# Patient Record
(demographics unavailable — no encounter records)

---

## 2025-02-28 NOTE — ASSESSMENT
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   She C/O LLQ pain ever since colon perforation in 2007 that is intermittent and sometimes goes away after having a BM. She denied constipation, diarrhea, or blood in the stool. She sometimes gets postprandial epigastric pain (q 2 months or less) that gets better with drinking water. No vomiting, dysphagia, melena, or weight loss.   #H/O LLQ pain, intermittent ever since 2007 after colon perforation #Diverticulosis #CRC screening - All prior CT scans, US, MRI, labs reviewed - Cologuard test done recently; will obtain a copy of the results when it is available - Pt is very reluctant to have a colonoscopy at this time due to concern for increased irritation to her rectum from the prep - Will get an MRI of Abdomen and Pelvis with IV Contrast since she cannot have contrast with CT due to  allergy - Will also get a copy of her most recent labs done at [facility]; pt refused repeat labs at this time - Pt educated about diverticulosis and the need to follow a high fiber diet with increased water intake to ensure regular BMs q day without any straining to prevent diverticulitis  - Will consider a CT virtual colonoscoy depending on the results of the cologuard and MRI  Pt and her  verbalized understanding and agreed with the plan. F/U after MRI is done

## 2025-02-28 NOTE — PHYSICAL EXAM
[Alert] : alert [Normal Voice/Communication] : normal voice/communication [No Acute Distress] : no acute distress [Well Developed] : well developed [Well Nourished] : well nourished [Sclera] : the sclera and conjunctiva were normal [Hearing Threshold Finger Rub Not [place]] : hearing was normal [Normal Appearance] : the appearance of the neck was normal [No Respiratory Distress] : no respiratory distress [Respiration, Rhythm And Depth] : normal respiratory rhythm and effort [Heart Rate And Rhythm] : heart rate was normal and rhythm regular [Bowel Sounds] : normal bowel sounds [No Acc Muscle Use] : no accessory muscle use [Auscultation Breath Sounds / Voice Sounds] : lungs were clear to auscultation bilaterally [Normal S1, S2] : normal S1 and S2 [No Masses] : no abdominal mass palpated [Abdomen Soft] : soft [LLQ] : LLQ [Abnormal Walk] : normal gait [Normal Color / Pigmentation] : normal skin color and pigmentation [Oriented To Time, Place, And Person] : oriented to person, place, and time [Ascites: ___] : no ascites [Rebound Tenderness] : no rebound tenderness

## 2025-02-28 NOTE — HISTORY OF PRESENT ILLNESS
[FreeTextEntry1] : 76 yr old female with H/O Vulvar Paget's Disease which spread to the rectum with vulvectomy and rectal surgery (at [facility] [place] in the 1990's) now on aldara cream, HTN, HLD, Borderline DM, H/O perforated colon (?sigmoid colon) after a colonoscopy done in 2007, H/O colon polyps, H/O gastric polyps, hiatal hernia presents here for further evaluation of LLQ pain that she has had on and off since 2007 after the colon perforation.   She C/O LLQ pain ever since colon perforation in 2007 that is intermittent and sometimes goes away after having a BM. She denied constipation, diarrhea, or blood in the stool. She sometimes gets postprandial epigastric pain (q 2 months or less) that gets better with drinking water. No vomiting, dysphagia, melena, or weight loss.  She had a cologuard test done recently with her PCP; results are pending.  CT scan of Abdomen and Pelvis without contrast (due to allergy to contrast dye) done 11/3/24 reviewed: Findings suggestive of cystitis without SBO, appendicitis, diverticulitis, or other abdominal abnormalities.  Labs 11/24 reviewed: CBC WBC 17.62 15/44.6 CMP Alk Phos 124, LFTs otherwise normal GFR 77   US of Abdomen done 2/21/25 reviewed: Hepatic steatosis, unremarkable GB  Colonoscopy done in 2018 reviewed: no polyps, erythema of rectum noted, biopsies done, results not available  Colonoscopy done in 2007 reviewed: diverticulosis of the whole colon noted, small rectal polyp, hemorrhoids  EGD done in 2007 reviewed: Small hiatal hernia, fundic gland polyps, non-erosive gastritis and duodenitis  MRI of Pelvis done 3/23 reviewed: Hyperemia in perianal skin, mild hyperemia possibly reacitve in the right posterolateral distal anal canal  She is reluctant to have any more colonoscopies due to concern of increased irritation of the rectum from the prep.

## 2025-03-28 NOTE — ASSESSMENT
[FreeTextEntry1] : 76 yr old female with H/O Vulvar Paget's Disease which spread to the rectum with vulvectomy and rectal surgery (at [facility] [place] in the 1990's) now on aldara cream, HTN, HLD, Borderline DM, H/O perforated colon (?sigmoid colon) after a colonoscopy done in 2007, H/O colon polyps, H/O gastric polyps, hiatal hernia presented here last month for further evaluation of LLQ pain that she has had on and off since 2007 after the colon perforation.   She C/O LLQ pain ever since colon perforation in 2007 that is intermittent and sometimes goes away after having a BM. She denied constipation, diarrhea, or blood in the stool. She sometimes gets postprandial epigastric pain (q 2 months or less) that gets better with drinking water. No heartburn, vomiting, dysphagia, melena, or weight loss.  She had a cologuard test done recently with her PCP and was negative (will get results)  MRI of Abdomen and Pelvis with IV Contrast done 3/13/25 reviewed: Hepatomegaly with diffuse fat deposition, tiny cysts scattered in the liver. The remainder of the exam is unremarkable. No abdominal or pelvic lymphadenopathy and no free fluid in the abdomen or pelvis.     #H/O LLQ pain, intermittent ever since 2007 after colon perforation #Diverticulosis #CRC screening - MRI done 3/13/25 reviewed and was unrevealing as to the etiology of the pain - All prior CT scans, US, MRI, labs reviewed - Cologuard test done recently; will obtain a copy of the results from PCP Dr. Cazares - Pt is very reluctant to have a colonoscopy at this time due to concern for increased irritation to her rectum from the prep - Will also get a copy of her most recent labs done at [facility]; pt refused repeat labs at this time - Pt educated about diverticulosis and the need to follow a high fiber diet with increased water intake to ensure regular BMs q day without any straining to prevent diverticulitis  - CT virtual colonoscopy discussed to further evaluate the colon in lieu of a colonoscopy but she declined  #Hepatic Steatosis with Hepatomegaly - Will do CLD panel and Fibroscan  - Weight loss recommended  - Pt wants to wait until June to do the labs with her other lab tests - F/U in July   Pt and her  verbalized understanding and agreed with the plan.

## 2025-03-28 NOTE — HISTORY OF PRESENT ILLNESS
[FreeTextEntry1] : 76 yr old female with H/O Vulvar Paget's Disease which spread to the rectum with [place]vectomy and [place] [facility] [place] now on aldara cream, HTN, HLD, Borderline DM, H/O perforated colon (?sigmoid colon) after a colonoscopy done in 2007, H/O colon polyps, H/O gastric polyps, hiatal hernia presented here last month for further evaluation of LLQ pain that she has had on and off since 2007 after the colon perforation.   She C/O LLQ pain ever since colon perforation in 2007 that is intermittent and sometimes goes away after having a BM. She denied constipation, diarrhea, or blood in the stool. She sometimes gets postprandial epigastric pain (q 2 months or less) that gets better with drinking water. No heartburn, vomiting, dysphagia, melena, or weight loss.  She had a cologuard test done recently with her PCP and was negative (will get results)  MRI of Abdomen and Pelvis with IV Contrast done 3/13/25 reviewed: Hepatomegaly with diffuse fat deposition, tiny cysts scattered in the liver. The remainder of the exam is unremarkable. No abdominal or pelvic lymphadenopathy and no free fluid in the abdomen or pelvis.  CT scan of Abdomen and Pelvis without contrast (due to allergy to contrast dye) done 11/3/24 reviewed: Findings suggestive of cystitis without SBO, appendicitis, diverticulitis, or other abdominal abnormalities.  Labs 11/24 reviewed: CBC WBC 17.62 15/44.6 CMP Alk Phos 124, LFTs otherwise normal GFR 77   US of Abdomen done 2/21/25 reviewed: Hepatic steatosis, unremarkable GB  Colonoscopy done in 2018 reviewed: no polyps, erythema of rectum noted, biopsies done, results not available  Colonoscopy done in 2007 reviewed: diverticulosis of the whole colon noted, small rectal polyp, hemorrhoids  EGD done in 2007 reviewed: Small hiatal hernia, fundic gland polyps, non-erosive gastritis and duodenitis  MRI of Pelvis done 3/23 reviewed: Hyperemia in perianal skin, mild hyperemia possibly reacitve in the right posterolateral distal anal canal  She is reluctant to have any more colonoscopies due to concern of increased irritation of the rectum from the prep.

## 2025-03-28 NOTE — PHYSICAL EXAM
[Alert] : alert [Normal Voice/Communication] : normal voice/communication [No Acute Distress] : no acute distress [Well Developed] : well developed [Well Nourished] : well nourished [Sclera] : the sclera and conjunctiva were normal [Hearing Threshold Finger Rub Not [place]] : hearing was normal [Normal Lips/Gums] : the lips and gums were normal [Normal Appearance] : the appearance of the neck was normal [No Neck Mass] : no neck mass was observed [No Respiratory Distress] : no respiratory distress [No Acc Muscle Use] : no accessory muscle use [Respiration, Rhythm And Depth] : normal respiratory rhythm and effort [Auscultation Breath Sounds / Voice Sounds] : lungs were clear to auscultation bilaterally [Heart Rate And Rhythm] : heart rate was normal and rhythm regular [Normal S1, S2] : normal S1 and S2 [Bowel Sounds] : normal bowel sounds [Abdomen Tenderness] : non-tender [No Masses] : no abdominal mass palpated [Abdomen Soft] : soft [Abnormal Walk] : normal gait [Normal Color / Pigmentation] : normal skin color and pigmentation [Oriented To Time, Place, And Person] : oriented to person, place, and time